# Patient Record
Sex: MALE | Race: WHITE
[De-identification: names, ages, dates, MRNs, and addresses within clinical notes are randomized per-mention and may not be internally consistent; named-entity substitution may affect disease eponyms.]

---

## 2017-05-12 ENCOUNTER — HOSPITAL ENCOUNTER (OUTPATIENT)
Dept: HOSPITAL 63 - RAD | Age: 37
Discharge: HOME | End: 2017-05-12
Attending: NURSE PRACTITIONER
Payer: COMMERCIAL

## 2017-05-12 DIAGNOSIS — M79.89: Primary | ICD-10-CM

## 2017-05-12 DIAGNOSIS — R60.0: ICD-10-CM

## 2017-05-12 PROCEDURE — 93931 UPPER EXTREMITY STUDY: CPT

## 2017-05-12 NOTE — RAD
Upper extremity venous right 

Indication: Right forearm and elbow swelling. 

Grayscale, color flow and duplex Doppler evaluation of the right upper 

extremity venous system was performed. 

The right internal jugular vein as well as the right subclavian and 

axillary veins are patent. The brachial vein is patent. The basilic and 

cephalic veins are patent. No thrombus is seen. Radial and ulnar veins are

patent. There is moderate subcutaneous edema noted in the forearm. No 

well-formed fluid collection or abscess is identified. 

Impression: Forearm subcutaneous edema. There is no evidence of a right 

upper extremity DVT. 

 

Electronically signed by: Hansel Fraser MD (May 12, 2017 17:50:22)

## 2017-05-13 ENCOUNTER — HOSPITAL ENCOUNTER (INPATIENT)
Dept: HOSPITAL 63 - ICU | Age: 37
LOS: 1 days | Discharge: HOME | DRG: 603 | End: 2017-05-14
Attending: FAMILY MEDICINE | Admitting: FAMILY MEDICINE
Payer: COMMERCIAL

## 2017-05-13 VITALS — DIASTOLIC BLOOD PRESSURE: 47 MMHG | SYSTOLIC BLOOD PRESSURE: 116 MMHG

## 2017-05-13 VITALS — SYSTOLIC BLOOD PRESSURE: 139 MMHG | DIASTOLIC BLOOD PRESSURE: 71 MMHG

## 2017-05-13 VITALS — SYSTOLIC BLOOD PRESSURE: 123 MMHG | DIASTOLIC BLOOD PRESSURE: 69 MMHG

## 2017-05-13 VITALS — BODY MASS INDEX: 36.45 KG/M2 | WEIGHT: 315 LBS | HEIGHT: 78 IN

## 2017-05-13 DIAGNOSIS — E29.1: ICD-10-CM

## 2017-05-13 DIAGNOSIS — I10: ICD-10-CM

## 2017-05-13 DIAGNOSIS — F32.9: ICD-10-CM

## 2017-05-13 DIAGNOSIS — Z83.3: ICD-10-CM

## 2017-05-13 DIAGNOSIS — Z87.891: ICD-10-CM

## 2017-05-13 DIAGNOSIS — L03.113: Primary | ICD-10-CM

## 2017-05-13 DIAGNOSIS — Z82.49: ICD-10-CM

## 2017-05-13 DIAGNOSIS — Z80.1: ICD-10-CM

## 2017-05-13 LAB
ALBUMIN SERPL-MCNC: 3.7 G/DL (ref 3.4–5)
ALBUMIN/GLOB SERPL: 0.8 {RATIO} (ref 1–1.7)
ALP SERPL-CCNC: 60 U/L (ref 46–116)
ALT SERPL-CCNC: 57 U/L (ref 16–63)
ANION GAP SERPL CALC-SCNC: 9 MMOL/L (ref 6–14)
AST SERPL-CCNC: 31 U/L (ref 15–37)
BASOPHILS # BLD AUTO: 0 X10^3/UL (ref 0–0.2)
BASOPHILS NFR BLD: 0 % (ref 0–3)
BILIRUB SERPL-MCNC: 0.7 MG/DL (ref 0.2–1)
BUN/CREAT SERPL: 7 (ref 6–20)
CA-I SERPL ISE-MCNC: 8 MG/DL (ref 8–26)
CALCIUM SERPL-MCNC: 9 MG/DL (ref 8.5–10.1)
CHLORIDE SERPL-SCNC: 102 MMOL/L (ref 98–107)
CO2 SERPL-SCNC: 28 MMOL/L (ref 21–32)
CREAT SERPL-MCNC: 1.1 MG/DL (ref 0.7–1.3)
EOSINOPHIL NFR BLD: 0.3 X10^3/UL (ref 0–0.7)
EOSINOPHIL NFR BLD: 3 % (ref 0–3)
ERYTHROCYTE [DISTWIDTH] IN BLOOD BY AUTOMATED COUNT: 13.2 % (ref 11.5–14.5)
GFR SERPLBLD BASED ON 1.73 SQ M-ARVRAT: 76.6 ML/MIN
GLOBULIN SER-MCNC: 4.7 G/DL (ref 2.2–3.8)
GLUCOSE SERPL-MCNC: 104 MG/DL (ref 70–99)
HCT VFR BLD CALC: 40.4 % (ref 39–53)
HGB BLD-MCNC: 14 G/DL (ref 13–17.5)
LYMPHOCYTES # BLD: 2 X10^3/UL (ref 1–4.8)
LYMPHOCYTES NFR BLD AUTO: 20 % (ref 24–48)
MCH RBC QN AUTO: 29 PG (ref 25–35)
MCHC RBC AUTO-ENTMCNC: 35 G/DL (ref 31–37)
MCV RBC AUTO: 85 FL (ref 79–100)
MONO #: 1.1 X10^3/UL (ref 0–1.1)
MONOCYTES NFR BLD: 11 % (ref 0–9)
NEUT #: 6.4 X10^3UL (ref 1.8–7.7)
NEUTROPHILS NFR BLD AUTO: 65 % (ref 31–73)
PLATELET # BLD AUTO: 307 X10^3/UL (ref 140–400)
POTASSIUM SERPL-SCNC: 3.5 MMOL/L (ref 3.5–5.1)
PROT SERPL-MCNC: 8.4 G/DL (ref 6.4–8.2)
RBC # BLD AUTO: 4.77 X10^6/UL (ref 4.3–5.7)
SODIUM SERPL-SCNC: 139 MMOL/L (ref 136–145)
WBC # BLD AUTO: 9.8 X10^3/UL (ref 4–11)

## 2017-05-13 PROCEDURE — 85027 COMPLETE CBC AUTOMATED: CPT

## 2017-05-13 PROCEDURE — 87641 MR-STAPH DNA AMP PROBE: CPT

## 2017-05-13 PROCEDURE — 36415 COLL VENOUS BLD VENIPUNCTURE: CPT

## 2017-05-13 PROCEDURE — 87040 BLOOD CULTURE FOR BACTERIA: CPT

## 2017-05-13 PROCEDURE — 80053 COMPREHEN METABOLIC PANEL: CPT

## 2017-05-13 RX ADMIN — BUPROPION HYDROCHLORIDE SCH MG: 150 TABLET, FILM COATED, EXTENDED RELEASE ORAL at 20:22

## 2017-05-13 NOTE — ACF
Admission Criteria Forms


                                                                               

          


                            CELLULITIS





Clinical Indications for Admission to Inpatient Care





                                                 (Place 'X' for any and all 

applicable criteria):





Admission is indicated for ANY ONE of the following(1)(2)(3)(4)(5):


[ ]I.   Limb-threatening infection


[ ]II.  High-risk comorbid condition as indicated by ANY ONE of the following:


        [ ]a)   Uncontrolled diabetes (eg, HbA1c greater than 10% (0.1))    


        [ ]b)   Cirrhosis


        [ ]c)   Neutropenia   


        [ ]d)   Asplenia     


        [ ]e)   Immunosuppression    


        [ ]f)    Symptomatic heart failure


[X]III. Failure of outpatient therapy as indicated by ALL of the following:


        [X]a)   Progression or no improvement after adequate trial (minimum of 

48 hours, with longer period


                 for stable lower extremity infection)


        [X]b)   Adequate antibiotic regimen as indicated by use of ANY ONE of 

the following:


                 [ ]i)     First-generation cephalosporin (e.g., cephalexin)


                 [ ]ii)    Antistaphylococcal penicillin (e.g., dicloxacillin)


                 [X]iii)   Penicillin-allergic patient regimen (clindamycin, 

extended-spectrum   fluoroquinolone, or doxycycline)


                 [ ]iv)   Resistant organism (eg, methicillin-resistant 

Staphylococcus aureus) regimen (6)


        [X]c)   Outpatient intravenous therapy regimen is not appropriate due 

to ANY ONE of the following. (7)(8)(9)(10):


                 [ ]i)    It was tried and was not successful (eg, progression 

of infection). 


                 [ ]ii)   It is not available or cannot be arranged in a 

clinically appropriate time frame (e.g., the next day). 


                 [X]iii)  Clinical presentation (eg, acuity of infection, 

rapidity of progression, confirmed or suspected bacteremia)


                         is judged to require ALL of the following:


                          [X]1)    Immediate initiation of intravenous therapy (

eg, cannot wait for next day) 


                          [X]2)    Intensity of patient monitoring and 

observation (eg, vital sign measurement,  checks for infection progression) 


                                    that cannot be provided at other   than 

inpatient level of care


[ ]IV.   Mental status changes


[ ]V.    Bacteremia


[ ]VI.   Hemodynamic instability


[ ]VII.  Suspected necrotizing soft tissue infection (e.g., gas in tissue)(11)(

12)


[ ]VIII.  Orbital infection (13)(14)


[ ]IX.    Associated surgical procedure (e.g., abscess drainage, debridement) 

not amenable to outpatient, emergency department, or observation care


[ ]X.     Cutaneous gangrene


[ ]XI.    High fever (temperature greater than 39.5 degrees C (103.1 degrees F) 

(oral)) not responsive to outpatient, 


          emergency department, or observation care therapy


[ ]XIII.  Inpatient admission required rather than observation care (Also use 

Cellulitis: Observation Care as appropriate) because of ANY ONE of the following

:   


          [ ]a)   Periorbital or perineal infection that is severe or worsening


          [ ]b)   Severe pain requiring acute inpatient management


          [ ]c)   IV fluid to replace significant ongoing (e.g., for over 24 

hours) losses (greater than 3L/m2 per day)


          [ ]d)   Compartment syndrome monitoring (17)


          [ ]e)   Strict or protective (eg, laminar flow) isolation


          [ ]f)    Urgent debridement or skin grafting


          [ ]g)   Bone or joint debridement


          [ ]h)   Immediate inpatient surgery


          [ ]i)    Other condition, treatment or monitoring requiring inpatient

  admission








Extended stay beyond goal length of stay may be needed for (1)(18):


 [ ]a)    Necrotizing soft tissue infection or fasciitis 


 [ ]b)    Gram-negative infection


 [ ]c)    Methicillin-resistant Staphylococcal aureus (MRSA) infection 


 [ ]d)    Peripheral venous insufficiency with cellulitis


 [ ]e)    Extensive edema


 [ ]f)     Sepsis or continued Hemodynamic instability


 [ ]g)    Continued high fever or mental status change


 [ ]h)    Bacteremia


 [ ]i)     Active serious comorbid conditions ( eg,  heart failure, renal 

insufficiency)            





       


                                             


The original [facility] [employer] content created by [facility] 

[employer] has been revised. 


The portions of the content which have been revised are identified through the 

use of italic text or in bold, and [facility] 


has neither reviewed nor approved the modified material. All other unmodified 

content is copyright  [facility]





Please see references footnoted in the original [facility] edition 

2016


Admission Criteria Met?:  Yes











JOSELINE HERNANDEZ May 13, 2017 13:32

## 2017-05-13 NOTE — NUR
Pharmacy Vancomycin Dosing Note



S:Consulted to monitor and dose vancomycin started 05/13/17.



O:MARLEY BOWEN is a 36 year old M with 

Cellulitis .



Height:  feet,  inches

Weight:  kg

Ideal Body Weight: 

Adjusted Body Weight: 

Dosing Weight: Actual



Other Antibiotics: 



LABS:

Last BUN: 8 

Last Creatinine: 1.1 

Creatinine Clearance: 1.1 

Last WBC: 9.8 

Last Platelets: 307 

Tmax (past 24 hours): 



Microbiology: 

OUTPATIENT LAB CONFIRMED MRSA VANCOMYCIN SENSITIVE



I/O: 



Drug Levels:

Last  level:  on  at 

Last dose given 05/13/17 at 1258 



Vancomycin Dosing:

Loading Dose: 2000 mg x1

Dosing Weight: Actual

Target Trough: 10-20





A: Based on: 





P: 1. Begin Vancomycin 2000 mg IV q12h

   2. Follow up Trough level on 05/15/17 at 0030 

   3. Pharmacy will continue to monitor, follow and adjust therapy as needed.

 

 ANDRE LEGER [facility],  05/13/17 [id number]

## 2017-05-13 NOTE — NUR
The patient, MARLEY BOWEN, 37 y/o, M admitted by ANCELMO ALFARO MD, was given 
written information regarding hospital policies, unit procedures and contact persons.  



Valuables were checked and documented. Dr. Alfaro came to patient's bedside and explained 
that patient has MRSA in his right elbow wound.  IV antibiotics will be infused.  Patient 
resting in bed at this time, will continue to monitor.

## 2017-05-13 NOTE — PDOC1
History of Present Illness


Reason for Visit:  Worsening cellulitis right arm


History of Present Illness


Pt developed an abscess on his right elbow area earlier this week.  He was seen 

on 5/10 by Karishma Moran, and diagnosed w/ cellulitis and abscess.  Culture was 

obtained, blood was drawn.  Pt was placed on Bactrim.  He works as a guard at 

the jail, and states that there have been many cases of MRSA recently.  Pt 

continued to have worsening swelling and redness of the arm, and was seen again 

yesterday by Karishma Moran and myself.  He was sent for a venous doppler and soft 

tissue sonogram to rule out DVT and abscess, and these were both negative last 

night.  He was placed on Clindamycin.  He returned this morning and the redness 

and swelling had extended into his hand.  I recommended admission for IV abx 

due to failure of adequate outpatient therapy.  Pt has a history of 

hypertension and depression as well, but no hx of DM.


Chief Complaint:  Right arm pain/swelling


Allergies:  


Coded Allergies:  


     No Known Drug Allergies (Unverified , 5/13/17)


Past Medical History


Cardiac:  HTN


Psych:  Depression


Endocrine:  Other (Testosterone deficiency)


Past Surgical History:  Other (Vasectomy)


Family History:  Cancer (Lung (pat GM)), CAD, DM





Past Social History


Smoke:  Quit


Alcohol:  none


Drugs:  None


Lives:  with Family





Review of Systems


Review Of Systems


Fourteen system , review of systems has been reviewed. See HPI for pertinent 

positives and negative responses, other wise all other systems are negative, 

non pertinent or non contributory


Constitutional:  No: Fever, Chills, Sweats


Eyes:  No: Blurry vision, Double vision


ENT:  No: Nose discharge, Nose congestion


Respiratory:  No: Cough, Shortness of breath


Cardiovascular:  No: Chest Pain, Palpitations


Gastrointestinal:  No: Nausea, Vomiting, Abdominal Pain, Diarrhea, Constipation

, Melena, Hematochezia


Genitourinary:  No: Dysuria, Henaturia, Nocturia


Musculoskeletal:  No: Gait Disturbance, Joint Pain


SKIN:  YES: Warm, Dry, No Rashes


Neurological:  No: Confusion, Dizziness, Headaches, Impaired Coord/balance


Allergies:  


Coded Allergies:  


     No Known Drug Allergies (Unverified , 5/13/17)


Medications





Current Medications


Sodium Chloride (Normal Saline Flush) 3 ml PRN DAILY  PRN IV AFTER MEDS AND 

BLOOD DRAWS;  Start 5/13/17 at 11:00;  Status UNV


Acetaminophen (Tylenol) 650 mg PRN Q6HRS  PRN PO Headaches, Temp > 101.5F;  

Start 5/13/17 at 11:00;  Status UNV


Calcium Carbonate/ Glycine (Tums) 500 mg PRN Q3HRS  PRN PO HEARTBURN / GAS;  

Start 5/13/17 at 11:00;  Status UNV


Ibuprofen (Motrin) 400 mg PRN Q6HRS  PRN PO MILD PAIN;  Start 5/13/17 at 11:00;

  Status UNV


Vancomycin HCl (Vanco Per Pharmacy) 1 each PRN DAILY  PRN MC SEE COMMENTS;  

Start 5/13/17 at 11:00;  Status UNV





Exam


General Appearance:  Alert, Oriented X3, Cooperative, No acute distress


HEENT:  Atraumatic, PERRLA, EOMI, Mucous membr. moist/pink, Other (Neck supple, 

)


Respiratory:  Clear to auscultation, Normal air movement


Heart:  Regular rate, Normal S1, Normal S2, No murmurs


Abdominal:  Soft, No tenderness, No hepatospenomegaly


Extremities:  No clubbing, No cyanosis, No edema, Other (Right arm from bicep 

to wrist is significantly more swollen than the left.  THe lateral elbow area 

has a draining wound without fluctuance.  There is erythema extending into the 

wrist.  BOth arms are tattooed extensively (not new))


Skin:  No breakdown


Neuro:  Normal speech, Strength at 5/5 X4 ext, Normal tone, Sensation intact, 

Cranial nerves 3-12 NL, Reflexes 2+


Psych/Mental Status:  Mental status NL, Mood NL





Assessment/Plan


Assessment/Plan


1.  Cellulitis right arm:  Wound culture obtained in office 5/10 shows MRSA, 

sensitive to Vanc.  We will treat w/ Vanc and when improving d/c on PO 

Clindamycin.  Check blood cultures, CMP, CBC as well.





2.  HTN:  Cont lisinopril.





3.  Depression:  Cont Wellbutrin.





4.  DVT proph:  Pt is fully ambulatory and should continue to do so.  Risk of 

DVT is low.





5.  Disp:  Expect 2 days of IV abx, and when improving, d/c home on oral abx.  

Culture is sensitive to Clindamycin, and pt already has that at home, so we 

will likely just have him finish that when he is ultimately discharged.





COURSE





 Allergies








Coded Allergies Type Severity Reaction Last Updated Verified


 


  No Known Drug Allergies    5/13/17 No








 Current Medications








 Medications


  (Trade)  Dose


 Ordered  Sig/Tian


 Route


 PRN Reason  Start Time


 Stop Time Status Last Admin


Dose Admin


 


 Sodium Chloride


  (Normal Saline


 Flush)  3 ml  PRN DAILY  PRN


 IV


 AFTER MEDS AND BLOOD DRAWS  5/13/17 11:00


   UNV  


 


 


 Acetaminophen


  (Tylenol)  650 mg  PRN Q6HRS  PRN


 PO


 Headaches, Temp > 101.5F  5/13/17 11:00


   UNV  


 


 


 Calcium Carbonate/


 Glycine


  (Tums)  500 mg  PRN Q3HRS  PRN


 PO


 HEARTBURN / GAS  5/13/17 11:00


   UNV  


 


 


 Ibuprofen


  (Motrin)  400 mg  PRN Q6HRS  PRN


 PO


 MILD PAIN  5/13/17 11:00


   UNV  


 


 


 Vancomycin HCl


  (Vanco Per


 Pharmacy)  1 each  PRN DAILY  PRN


 MC


 SEE COMMENTS  5/13/17 11:00


   UNV  


 

















ANCELMO ALFARO MD May 13, 2017 11:22
[phone]

## 2017-05-14 VITALS — DIASTOLIC BLOOD PRESSURE: 66 MMHG | SYSTOLIC BLOOD PRESSURE: 135 MMHG

## 2017-05-14 VITALS — SYSTOLIC BLOOD PRESSURE: 134 MMHG | DIASTOLIC BLOOD PRESSURE: 78 MMHG

## 2017-05-14 RX ADMIN — VANCOMYCIN HYDROCHLORIDE SCH MLS/HR: 1 INJECTION, POWDER, LYOPHILIZED, FOR SOLUTION INTRAVENOUS at 01:27

## 2017-05-14 RX ADMIN — VANCOMYCIN HYDROCHLORIDE SCH MLS/HR: 1 INJECTION, POWDER, LYOPHILIZED, FOR SOLUTION INTRAVENOUS at 12:28

## 2017-05-14 RX ADMIN — BUPROPION HYDROCHLORIDE SCH MG: 150 TABLET, FILM COATED, EXTENDED RELEASE ORAL at 08:23

## 2017-05-15 NOTE — DS
DATE OF DISCHARGE:  05/14/2017



HISTORY OF PRESENTING ILLNESS:  This is a 36-year-old male patient who was

admitted yesterday with worsening cellulitis of his right arm.  He apparently

developed an abscess in his right elbow earlier this week.  He was seen on

05/10/2017, was diagnosed with cellulitis and abscess.  Cultures were obtained

and he was placed on Bactrim.  However, the patient continued to have worsening

swelling and redness with arm and was seen again yesterday at his primary care

physician's office and was sent for a venous Doppler ultrasound and soft tissue

sonogram to rule out DVT and abscess and these were both negative and he was

placed on clindamycin and apparently he came yesterday with again the redness

and swelling has extended into his hand, and therefore, he was admitted and was

started on IV vancomycin.  When I saw him today, he looked well and was clearly

in no apparent respiratory distress.



PHYSICAL EXAMINATION:

GENERAL:  There was no ____ thyromegaly, jugular venous distention.  ____ edema.

VITAL SIGNS:  His heart rate was 89, blood pressure 135/66, temperature was

97.9, respiratory rate was 12 and oxygen saturation was 98%.

Examination of the right arm showed mostly the redness and swelling has largely

subsided and there was small area on the outer aspect of the right elbow where

some purulent material was expressed by pressure.  It was not really tender. 

There was no fluctuation or induration.  As the patient is afebrile,

hemodynamically stable, has normal white cell count a decision was made to

discharge him home to continue antibiotic treatment in the form of Bactrim and

clindamycin.



FINAL DISCHARGE DIAGNOSES:  Right forearm cellulitis, hypertension, depression.





______________________________

ROMY ALFARO MD



DR:  KAMRYN/viviane  JOB#:  [id number] / [id number]

DD:  05/14/2017 14:07  DT:  05/14/2017 22:26

## 2022-02-14 ENCOUNTER — HOSPITAL ENCOUNTER (OUTPATIENT)
Dept: HOSPITAL 61 - ECHO | Age: 42
End: 2022-02-14
Payer: COMMERCIAL

## 2022-02-14 DIAGNOSIS — I51.7: Primary | ICD-10-CM

## 2022-02-14 PROCEDURE — 93306 TTE W/DOPPLER COMPLETE: CPT

## 2022-02-14 PROCEDURE — C8929 TTE W OR WO FOL WCON,DOPPLER: HCPCS

## 2022-02-15 NOTE — CARD
MR#: [id number]

Account#: [id number]

Accession#: [id number]

Date of Study: 02/14/2022

Ordering Physician: RADHA WILLSON, 

Referring Physician: RADHA WILLSON, 

Tech: Elmer Stevens RUST





--------------- APPROVED REPORT --------------





EXAM: Two-dimensional and M-mode echocardiogram with Doppler and color Doppler.



Other Information 

Quality : AverageHR: 74bpm

Rhythm : NSRTechnically limited study due to  body habitus.



INDICATION

Hypertension/HCVD



RISK FACTORS

Hypertension 

Obesity   

Family History 



2D DIMENSIONS 

Left Atrium(2D)4.2 (1.6-4.0cm)IVSd1.5 (0.7-1.1cm)

Aortic Root(2D)3.5 (2.0-3.7cm)LVDd5.3 (3.9-5.9cm)

LVOT Diameter2.1 (1.8-2.4cm)PWd1.5 (0.7-1.1cm)

LVDs3.6 (2.5-4.0cm)FS (%) 31.9 %

SV79.4 mlLVEF(%)59.5 (>50%)



Aortic Valve

AoV Peak Abelino.156.9cm/sAoV VTI27.8cm

AO Peak GR.9.8mmHgLVOT Peak Abelino.114.3cm/s

AO Mean GR.5mmHgAVA (VMAX)2.59cm2



Mitral Valve

MV E Akpadtxs103.9cm/sMV E Peak Gr.6mmHg

MV DECEL [id number] A Eazduqiz64.9cm/s

MV E Mean Gr.2mmHgE/A  Ratio1.1



Pulmonary Valve

PV Peak Oytnxlvu393.2cm/s



Tricuspid Valve

TR P. Ronqtlge822zp/sTR Peak Gr.18mmHg



Pulmonary Vein

S1 Uklkcrkl07.3cm/sD2 Xbqhiqpj30.4cm/s



 LEFT VENTRICLE 

The left ventricle is normal size. There is mild to moderate concentric left ventricular hypertrophy.
 The left ventricular systolic function is normal and the ejection fraction is within normal range. E
F 55% There is normal LV segmental wall motion. Transmitral Doppler flow pattern is Grade II-pseudono
rmal filling dynamics. No left ventricle thrombus noted on this study. There is no ventricular septal
 defect visualized. There is no left ventricular aneurysm. There is no mass noted in the left ventric
le.



 RIGHT VENTRICLE 

The right ventricle is normal size. There is normal right ventricular wall thickness. The right ventr
icular systolic function is normal.



 ATRIA 

The left atrium is mildly dilated. The right atrium size is normal. The interatrial septum is intact 
with no evidence for an atrial septal defect or patent foramen ovale as noted on 2-D or Doppler imagi
ng.



 AORTIC VALVE 

The aortic valve is normal in structure and function. The aortic valve is trileaflet. Doppler and Col
or Flow revealed no significant aortic regurgitation. There is no significant aortic valvular stenosi
s. There is no aortic valvular vegetation.



 MITRAL VALVE 

The mitral valve is normal in structure and function. There is no evidence of mitral valve prolapse. 
There is no mitral valve stenosis. Doppler and Color-flow revealed trace mitral regurgitation.



 TRICUSPID VALVE 

The tricuspid valve is normal in structure and function. Doppler and Color Flow revealed trace tricus
pid regurgitation. There is no tricuspid valve prolapse or vegetation. There is no tricuspid valve st
enosis.



 PULMONIC VALVE 

Doppler and Color Flow revealed no pulmonic valvular regurgitation. There is no pulmonic valvular sabina
nosis.



 GREAT VESSELS 

The aortic root is normal in size. The ascending aorta is normal in size. The IVC is normal in size a
nd collapses >50% with inspiration.



 PERICARDIAL EFFUSION 

There is no pleural effusion. There is no evidence of significant pericardial effusion.



Critical Notification

Critical Value: No



<Conclusion>

The left ventricular systolic function is normal and the ejection fraction is within normal range. EF
 55%

There is normal LV segmental wall motion.

There is mild to moderate concentric left ventricular hypertrophy.



Signed by : Daniel Pearson, 

Electronically Approved : 02/15/2022 08:55:04